# Patient Record
Sex: FEMALE | URBAN - METROPOLITAN AREA
[De-identification: names, ages, dates, MRNs, and addresses within clinical notes are randomized per-mention and may not be internally consistent; named-entity substitution may affect disease eponyms.]

---

## 2023-01-09 ENCOUNTER — OFFICE VISIT (OUTPATIENT)
Dept: URBAN - METROPOLITAN AREA MEDICAL CENTER 25 | Facility: MEDICAL CENTER | Age: 83
End: 2023-01-09
Payer: MEDICARE

## 2023-01-09 DIAGNOSIS — R93.3 ABN FINDINGS-GI TRACT: ICD-10-CM

## 2023-01-09 PROCEDURE — 45378 DIAGNOSTIC COLONOSCOPY: CPT | Performed by: INTERNAL MEDICINE

## 2023-01-13 ENCOUNTER — TELEPHONE ENCOUNTER (OUTPATIENT)
Dept: URBAN - METROPOLITAN AREA CLINIC 19 | Facility: CLINIC | Age: 83
End: 2023-01-13

## 2023-01-19 PROBLEM — 442182001: Status: ACTIVE | Noted: 2023-01-17

## 2023-01-19 PROBLEM — 81060008: Status: ACTIVE | Noted: 2023-01-17

## 2023-01-19 PROBLEM — 34000006: Status: ACTIVE | Noted: 2023-01-17

## 2023-01-31 ENCOUNTER — CLAIMS CREATED FROM THE CLAIM WINDOW (OUTPATIENT)
Dept: URBAN - METROPOLITAN AREA CLINIC 18 | Facility: CLINIC | Age: 83
End: 2023-01-31
Payer: MEDICARE

## 2023-01-31 DIAGNOSIS — K50.90 ABDOMINAL PAIN DESPITE THERAPY FOR CROHN'S DISEASE: ICD-10-CM

## 2023-01-31 PROCEDURE — 91299 UNLISTED DX GI PROCEDURE: CPT | Performed by: INTERNAL MEDICINE

## 2023-02-02 ENCOUNTER — TELEPHONE ENCOUNTER (OUTPATIENT)
Dept: URBAN - METROPOLITAN AREA CLINIC 19 | Facility: CLINIC | Age: 83
End: 2023-02-02

## 2023-03-03 ENCOUNTER — OFFICE VISIT (OUTPATIENT)
Dept: URBAN - METROPOLITAN AREA CLINIC 128 | Facility: CLINIC | Age: 83
End: 2023-03-03
Payer: MEDICARE

## 2023-03-03 ENCOUNTER — LAB OUTSIDE AN ENCOUNTER (OUTPATIENT)
Dept: URBAN - METROPOLITAN AREA CLINIC 19 | Facility: CLINIC | Age: 83
End: 2023-03-03

## 2023-03-03 VITALS
BODY MASS INDEX: 17.69 KG/M2 | SYSTOLIC BLOOD PRESSURE: 120 MMHG | WEIGHT: 82 LBS | TEMPERATURE: 98.1 F | DIASTOLIC BLOOD PRESSURE: 78 MMHG | HEIGHT: 57 IN

## 2023-03-03 DIAGNOSIS — R14.0 BLOATING: ICD-10-CM

## 2023-03-03 DIAGNOSIS — R19.7 DIARRHEA, UNSPECIFIED TYPE: ICD-10-CM

## 2023-03-03 DIAGNOSIS — R63.4 WEIGHT LOSS: ICD-10-CM

## 2023-03-03 PROBLEM — 62315008: Status: ACTIVE | Noted: 2023-01-17

## 2023-03-03 PROCEDURE — 99214 OFFICE O/P EST MOD 30 MIN: CPT | Performed by: INTERNAL MEDICINE

## 2023-03-03 RX ORDER — PAROXETINE HYDROCHLORIDE HEMIHYDRATE 10 MG/1
1 TABLET IN THE MORNING TABLET, FILM COATED ORAL ONCE A DAY
Status: ACTIVE | COMMUNITY

## 2023-03-03 RX ORDER — RIFAXIMIN 550 MG/1
1 TABLET TABLET ORAL THREE TIMES A DAY
Qty: 42 TABLET | Refills: 3 | OUTPATIENT
Start: 2023-03-03 | End: 2023-04-28

## 2023-03-03 RX ORDER — AMLODIPINE BESYLATE 2.5 MG/1
1 TABLET TABLET ORAL ONCE A DAY
Status: ACTIVE | COMMUNITY

## 2023-03-03 NOTE — PHYSICAL EXAM CONSTITUTIONAL:
Frail , thin elderly woman, in no acute distress , ambulating without difficulty , normal communication ability

## 2023-03-03 NOTE — PHYSICAL EXAM GASTROINTESTINAL
Abdomen , soft, nontender, moderately distended , tympanitic, no guarding or rigidity , no masses palpable , hyperactive  bowel sounds , Liver and Spleen , no hepatomegaly present , no hepatosplenomegaly , liver nontender , spleen not palpable

## 2023-03-03 NOTE — HPI-TODAY'S VISIT:
Ms. Walls is a 82-year-old woman who was referred initially by Dr. Kye Avendano for further evaluation.  She was initially seen by Dr. Merritt Silva for intermittent crampy abdominal pain and diarrhea.  Was diagnosed with IBS at the time.  Colonoscopy in 2017 showed diverticulosis along the entire colon with single sessile polyp in the right colon that was completely removed.  Of late she has been losing weight and has had significant abdominal pain and discomfort that is affecting her quality of life.  She had a CT enterography in November 2022 which showed possible distal colonic stricture.  There was some thickening of the small bowel in the distal small bowel as well was not concerning for possible inflammatory bowel disease.  She was referred to me by Dr. Kye Avendano for further evaluation with a colonoscopy to rule out Crohn's disease. I performed a colonoscopy on January 9, 2023.  The colonoscopy was extremely difficult due to significant looping and thin body habitus.  I was able to get into the terminal ileum which appeared normal but was unable to advance a colonoscopy any further to further evaluate the distal ileum due to looping of the scope that was falling out.  She had extensive diverticulosis in the left colon. Upon discussion with Dr. Kye Avendano, we then proceeded with a patency capsule test to see if that would go through before we do Endoscopy could be done.  However the Patency capsule got stuck in the distal ileum.  Hence Dr. Sanders proceeded with a laparotomy.  It was done on February 11, 2023.  It showed an internal hernia caused by a dense adhesive band that was going from the base of the cecum down to the right lower quadrant fallopian tube site.  She underwent appendicectomy with a generous partial cecectomy along with adhesion lysis of her right lower quadrant band.  Pathology showed fibrous adhesions of the vermiform appendix with benign lymph nodes next to it as well as fibroadipose and fibromuscular tissue with fibrous adhesions. She still has a lot of diarrhea and bloating. She still feels her abdomen feels firm. Dr. Avendano started her on High protein nutrion supplements but she demetrice no appetite and is unable to gain any weight.

## 2023-03-07 ENCOUNTER — P2P PATIENT RECORD (OUTPATIENT)
Age: 83
End: 2023-03-07

## 2023-03-08 LAB
C-REACTIVE PROTEIN, QUANT: <0.2
IMMUNOGLOBULIN A: 53
INTERPRETATION: (no result)
SED RATE BY MODIFIED: 6
T-TRANSGLUTAMINASE (TTG) IGG: <1
TISSUE TRANSGLUTAMINASE AB, IGA: <1

## 2023-03-09 ENCOUNTER — LAB OUTSIDE AN ENCOUNTER (OUTPATIENT)
Dept: URBAN - METROPOLITAN AREA CLINIC 19 | Facility: CLINIC | Age: 83
End: 2023-03-09

## 2023-03-10 ENCOUNTER — TELEPHONE ENCOUNTER (OUTPATIENT)
Dept: URBAN - METROPOLITAN AREA CLINIC 19 | Facility: CLINIC | Age: 83
End: 2023-03-10

## 2023-03-16 LAB
C-REACTIVE PROTEIN, QUANT: (no result)
CALPROTECTIN, FECAL: 152
FECAL FAT, QUALITATIVE: NORMAL
LEUKOCYTES: (no result)
PANCREATIC ELASTASE, FECAL: >500

## 2023-03-19 LAB
C-REACTIVE PROTEIN, QUANT: (no result)
CALPROTECTIN, FECAL: 183
FECAL FAT, QUALITATIVE: NORMAL
LEUKOCYTES: (no result)
PANCREATIC ELASTASE, FECAL: >500

## 2023-03-24 ENCOUNTER — CLAIMS CREATED FROM THE CLAIM WINDOW (OUTPATIENT)
Dept: URBAN - METROPOLITAN AREA MEDICAL CENTER 28 | Facility: MEDICAL CENTER | Age: 83
End: 2023-03-24

## 2023-03-24 ENCOUNTER — CLAIMS CREATED FROM THE CLAIM WINDOW (OUTPATIENT)
Dept: URBAN - METROPOLITAN AREA MEDICAL CENTER 28 | Facility: MEDICAL CENTER | Age: 83
End: 2023-03-24
Payer: MEDICARE

## 2023-03-24 DIAGNOSIS — R63.0 ALMOST NO APPETITE: ICD-10-CM

## 2023-03-24 DIAGNOSIS — R63.4 ABNORMAL INTENTIONAL WEIGHT LOSS: ICD-10-CM

## 2023-03-24 DIAGNOSIS — R19.7 ACUTE DIARRHEA: ICD-10-CM

## 2023-03-24 DIAGNOSIS — R55 ATYPICAL SYNCOPE: ICD-10-CM

## 2023-03-24 PROCEDURE — G8427 DOCREV CUR MEDS BY ELIG CLIN: HCPCS

## 2023-03-24 PROCEDURE — 99222 1ST HOSP IP/OBS MODERATE 55: CPT

## 2023-05-18 ENCOUNTER — DASHBOARD ENCOUNTERS (OUTPATIENT)
Age: 83
End: 2023-05-18

## 2023-05-19 ENCOUNTER — OFFICE VISIT (OUTPATIENT)
Dept: URBAN - METROPOLITAN AREA CLINIC 128 | Facility: CLINIC | Age: 83
End: 2023-05-19

## 2023-05-19 VITALS — HEIGHT: 67 IN

## 2023-05-19 RX ORDER — PAROXETINE HYDROCHLORIDE HEMIHYDRATE 10 MG/1
1 TABLET IN THE MORNING TABLET, FILM COATED ORAL ONCE A DAY
COMMUNITY

## 2023-05-19 RX ORDER — AMLODIPINE BESYLATE 2.5 MG/1
1 TABLET TABLET ORAL ONCE A DAY
COMMUNITY